# Patient Record
Sex: FEMALE | Race: WHITE | Employment: OTHER | URBAN - METROPOLITAN AREA
[De-identification: names, ages, dates, MRNs, and addresses within clinical notes are randomized per-mention and may not be internally consistent; named-entity substitution may affect disease eponyms.]

---

## 2017-02-17 ENCOUNTER — HOSPITAL ENCOUNTER (OUTPATIENT)
Dept: LAB | Age: 67
Discharge: HOME | End: 2017-02-17
Payer: MEDICARE

## 2017-02-17 LAB — ERYTHROCYTE [SEDIMENTATION RATE] IN BLOOD: 10 MM/HR (ref 0–30)

## 2017-02-17 PROCEDURE — 86140 C-REACTIVE PROTEIN: CPT | Performed by: ORTHOPAEDIC SURGERY

## 2017-02-17 PROCEDURE — 85652 RBC SED RATE AUTOMATED: CPT | Performed by: ORTHOPAEDIC SURGERY

## 2017-02-17 PROCEDURE — 36415 COLL VENOUS BLD VENIPUNCTURE: CPT | Performed by: ORTHOPAEDIC SURGERY

## 2017-02-18 LAB — CRP SERPL-MCNC: <0.3 MG/DL (ref 0–0.3)

## 2017-05-04 ENCOUNTER — HOSPITAL ENCOUNTER (EMERGENCY)
Age: 67
Discharge: HOME | End: 2017-05-05
Attending: EMERGENCY MEDICINE
Payer: MEDICARE

## 2017-05-04 DIAGNOSIS — F31.10 BIPOLAR I DISORDER WITH MANIA (HCC): Primary | ICD-10-CM

## 2017-05-04 LAB
BASOPHILS # BLD AUTO: 0 K/UL (ref 0–0.1)
BASOPHILS # BLD: 0 % (ref 0–2)
DIFFERENTIAL METHOD BLD: NORMAL
EOSINOPHIL # BLD: 0.1 K/UL (ref 0–0.4)
EOSINOPHIL NFR BLD: 1 % (ref 0–5)
ERYTHROCYTE [DISTWIDTH] IN BLOOD BY AUTOMATED COUNT: 13.5 % (ref 11.6–14.5)
HCT VFR BLD AUTO: 38.9 % (ref 35–45)
HGB BLD-MCNC: 12.8 G/DL (ref 12–16)
LYMPHOCYTES # BLD AUTO: 21 % (ref 21–52)
LYMPHOCYTES # BLD: 1.5 K/UL (ref 0.9–3.6)
MCH RBC QN AUTO: 30.3 PG (ref 24–34)
MCHC RBC AUTO-ENTMCNC: 32.9 G/DL (ref 31–37)
MCV RBC AUTO: 92 FL (ref 74–97)
MONOCYTES # BLD: 0.3 K/UL (ref 0.05–1.2)
MONOCYTES NFR BLD AUTO: 5 % (ref 3–10)
NEUTS SEG # BLD: 5 K/UL (ref 1.8–8)
NEUTS SEG NFR BLD AUTO: 73 % (ref 40–73)
PLATELET # BLD AUTO: 240 K/UL (ref 135–420)
PMV BLD AUTO: 10.4 FL (ref 9.2–11.8)
RBC # BLD AUTO: 4.23 M/UL (ref 4.2–5.3)
WBC # BLD AUTO: 6.9 K/UL (ref 4.6–13.2)

## 2017-05-04 PROCEDURE — 74011250636 HC RX REV CODE- 250/636: Performed by: EMERGENCY MEDICINE

## 2017-05-04 PROCEDURE — 80307 DRUG TEST PRSMV CHEM ANLYZR: CPT | Performed by: EMERGENCY MEDICINE

## 2017-05-04 PROCEDURE — 99284 EMERGENCY DEPT VISIT MOD MDM: CPT

## 2017-05-04 PROCEDURE — 93005 ELECTROCARDIOGRAM TRACING: CPT

## 2017-05-04 PROCEDURE — 80053 COMPREHEN METABOLIC PANEL: CPT | Performed by: EMERGENCY MEDICINE

## 2017-05-04 PROCEDURE — 85025 COMPLETE CBC W/AUTO DIFF WBC: CPT | Performed by: EMERGENCY MEDICINE

## 2017-05-04 PROCEDURE — 82550 ASSAY OF CK (CPK): CPT | Performed by: EMERGENCY MEDICINE

## 2017-05-04 PROCEDURE — 96372 THER/PROPH/DIAG INJ SC/IM: CPT

## 2017-05-04 RX ORDER — LORAZEPAM 2 MG/ML
2 INJECTION INTRAMUSCULAR ONCE
Status: COMPLETED | OUTPATIENT
Start: 2017-05-04 | End: 2017-05-04

## 2017-05-04 RX ORDER — HALOPERIDOL 5 MG/ML
5 INJECTION INTRAMUSCULAR
Status: COMPLETED | OUTPATIENT
Start: 2017-05-04 | End: 2017-05-04

## 2017-05-04 RX ADMIN — LORAZEPAM 2 MG: 2 INJECTION INTRAMUSCULAR; INTRAVENOUS at 22:54

## 2017-05-04 RX ADMIN — HALOPERIDOL LACTATE 5 MG: 5 INJECTION, SOLUTION INTRAMUSCULAR at 22:55

## 2017-05-05 VITALS
HEART RATE: 65 BPM | SYSTOLIC BLOOD PRESSURE: 127 MMHG | TEMPERATURE: 97.1 F | HEIGHT: 68 IN | BODY MASS INDEX: 24.67 KG/M2 | RESPIRATION RATE: 18 BRPM | DIASTOLIC BLOOD PRESSURE: 80 MMHG | WEIGHT: 162.8 LBS

## 2017-05-05 LAB
ALBUMIN SERPL BCP-MCNC: 3.6 G/DL (ref 3.4–5)
ALBUMIN/GLOB SERPL: 1.1 {RATIO} (ref 0.8–1.7)
ALP SERPL-CCNC: 79 U/L (ref 45–117)
ALT SERPL-CCNC: 36 U/L (ref 13–56)
AMPHET UR QL SCN: NEGATIVE
ANION GAP BLD CALC-SCNC: 10 MMOL/L (ref 3–18)
APAP SERPL-MCNC: <2 UG/ML (ref 10–30)
APPEARANCE UR: CLEAR
AST SERPL W P-5'-P-CCNC: 26 U/L (ref 15–37)
ATRIAL RATE: 84 BPM
BACTERIA URNS QL MICRO: NEGATIVE /HPF
BARBITURATES UR QL SCN: NEGATIVE
BENZODIAZ UR QL: NEGATIVE
BILIRUB SERPL-MCNC: 0.2 MG/DL (ref 0.2–1)
BILIRUB UR QL: NEGATIVE
BUN SERPL-MCNC: 33 MG/DL (ref 7–18)
BUN/CREAT SERPL: 31 (ref 12–20)
CALCIUM SERPL-MCNC: 9.1 MG/DL (ref 8.5–10.1)
CALCULATED P AXIS, ECG09: 60 DEGREES
CALCULATED R AXIS, ECG10: 43 DEGREES
CALCULATED T AXIS, ECG11: 54 DEGREES
CANNABINOIDS UR QL SCN: POSITIVE
CHLORIDE SERPL-SCNC: 102 MMOL/L (ref 100–108)
CK MB CFR SERPL CALC: 2.2 % (ref 0–4)
CK MB SERPL-MCNC: 2.9 NG/ML (ref 5–25)
CK SERPL-CCNC: 130 U/L (ref 26–192)
CO2 SERPL-SCNC: 26 MMOL/L (ref 21–32)
COCAINE UR QL SCN: NEGATIVE
COLOR UR: YELLOW
CREAT SERPL-MCNC: 1.08 MG/DL (ref 0.6–1.3)
DIAGNOSIS, 93000: NORMAL
EPITH CASTS URNS QL MICRO: NORMAL /LPF (ref 0–5)
ETHANOL SERPL-MCNC: <3 MG/DL (ref 0–3)
GLOBULIN SER CALC-MCNC: 3.3 G/DL (ref 2–4)
GLUCOSE SERPL-MCNC: 203 MG/DL (ref 74–99)
GLUCOSE UR STRIP.AUTO-MCNC: >1000 MG/DL
HDSCOM,HDSCOM: ABNORMAL
HGB UR QL STRIP: NEGATIVE
KETONES UR QL STRIP.AUTO: NEGATIVE MG/DL
LEUKOCYTE ESTERASE UR QL STRIP.AUTO: ABNORMAL
METHADONE UR QL: NEGATIVE
NITRITE UR QL STRIP.AUTO: NEGATIVE
OPIATES UR QL: NEGATIVE
P-R INTERVAL, ECG05: 138 MS
PCP UR QL: NEGATIVE
PH UR STRIP: 6.5 [PH] (ref 5–8)
POTASSIUM SERPL-SCNC: 4.3 MMOL/L (ref 3.5–5.5)
PROT SERPL-MCNC: 6.9 G/DL (ref 6.4–8.2)
PROT UR STRIP-MCNC: NEGATIVE MG/DL
Q-T INTERVAL, ECG07: 380 MS
QRS DURATION, ECG06: 88 MS
QTC CALCULATION (BEZET), ECG08: 449 MS
RBC #/AREA URNS HPF: NEGATIVE /HPF (ref 0–5)
SALICYLATES SERPL-MCNC: <2.8 MG/DL (ref 2.8–20)
SODIUM SERPL-SCNC: 138 MMOL/L (ref 136–145)
SP GR UR REFRACTOMETRY: 1.02 (ref 1–1.03)
TROPONIN I SERPL-MCNC: <0.02 NG/ML (ref 0–0.04)
UROBILINOGEN UR QL STRIP.AUTO: 0.2 EU/DL (ref 0.2–1)
VENTRICULAR RATE, ECG03: 84 BPM
WBC URNS QL MICRO: NORMAL /HPF (ref 0–4)

## 2017-05-05 PROCEDURE — 80307 DRUG TEST PRSMV CHEM ANLYZR: CPT | Performed by: EMERGENCY MEDICINE

## 2017-05-05 PROCEDURE — 81001 URINALYSIS AUTO W/SCOPE: CPT | Performed by: EMERGENCY MEDICINE

## 2017-05-05 NOTE — BSMART NOTE
Pt seen by Crisis in ED room 3. CC: chest pain, manic, THC abuse. Pt is alert, oriented , cooperative. Pt denies SI, HI or hallucinations. Pt is manic , loose associations. Pt is able to answer all questions but needs frequent redirection and has difficulty staying on topic. She denies hallucinations and does not appear to be responding to internal stimuli. Pt came to ED after calling 911 herself for C/O chest pain and having a heart attack. Pt has been medically cleared and did not have a heart attack. Pt admits to smoking marijuana prior to coming to the ED. She reports daily use of same. She is on Zoloft and Ativan and see's Dr Armando Suh out pt. She missed an out pt appointment yesterday. Pt is not suicidal, homicidal nor psychotic. She does not wish to be adm to the hospital. She does not meet criteria for a TDO. Per ER MD the family indicated to her they are all right with her returning home.      Plan : call for an ASAP appointment with her out pt tx team.  Discussed with ER MD.

## 2017-05-05 NOTE — ED NOTES
Pt. Arrived via [employer]. Called medics reporting she was having a heart attack. When medics arrived she was sitting down and flapping her arms. The strong smell of mariajuana noted. EKG normal in route, SBP elevated in 130s. Pt cursing and screaming upon arrival to hospital. Will not allow me to take get vitals or BP. Pt. Unable to carry coherent conversation.  Taking to herself, mumbling things like \"Mama wont let them in the house any more\" \"Gianluca worries when Charla Lin doesn't come home early\" \"I'm not drunk test my blood\"

## 2017-05-05 NOTE — ED PROVIDER NOTES
HPI Comments: 12:21 AM Mallory Mckay is a 77 y.o. female w/ hx of hypothyroidism, depression, stroke, and HTN who presents to the ED, via EMS, for mental health evaluation. Pt states that she called 911 due to CP earlier. Pt states that she thought she was going to die but her mother didnt believe her. Pt states that she did die but Charu Pipes brought her back. \" Pt notes that her pain is resolved but she knows that it \"was her heart that tried to kill her. \" Pt has been admitted in the past for psychosis. Pt has no other sx or complaints. The history is provided by the patient, the EMS personnel and medical records.         Past Medical History:   Diagnosis Date    Acute blood loss as cause of postoperative anemia 12/11/2016    Depression     On Sertraline    Dyslipidemia     On Atorvastatin    History of blood transfusion 12/11/2016    S/P Blood transfusion 2 units pRBC (12/11/2016)    History of fracture of humerus     Comminuted minimally displaced proximal right humerus fracture with extension into the greater tuberosity    History of fracture of lower extremity 2016    Left    History of hypertension     History of stroke without residual deficits 1998    History of thumb fracture     Left    Hypothyroidism (acquired)     Type 2 diabetes mellitus ([facility] [address]     Vitamin D insufficiency 12/14/2016    Vitamin D 25-Hydroxy (12/14/2016) = 22.0       Past Surgical History:   Procedure Laterality Date    HX CAROTID ENDARTERECTOMY Left     HX FEMUR FRACTURE TX Left 12/10/2016    S/P Open reduction internal fixation of closed displaced intertrochanteric fracture of the left hip  (12/10/2016 - Dr. Jolley [place], [place].)    HX HEMORRHOIDECTOMY  1972         Family History:   Problem Relation Age of Onset    COPD Mother     Heart Disease Father        Social History     Social History    Marital status: SINGLE     Spouse name: N/A    Number of children: N/A    Years of education: N/A     Occupational History  Not on file. Social History Main Topics    Smoking status: Former Smoker    Smokeless tobacco: Not on file    Alcohol use Yes      Comment: occas    Drug use: No    Sexual activity: Not on file     Other Topics Concern    Not on file     Social History Narrative         ALLERGIES: Percocet [oxycodone-acetaminophen]    Review of Systems   Unable to perform ROS: Mental status change       Vitals:    05/04/17 2300 05/04/17 2304 05/05/17 0720   BP: 135/75  127/80   Pulse:   65   Resp:   18   Temp:  98.1 °F (36.7 °C) 97.1 °F (36.2 °C)   Weight:  73.8 kg (162 lb 12.8 oz)    Height:  5' 8\" (1.727 m)             Physical Exam   Constitutional: No distress. HENT:   Head: Atraumatic. Eyes: Conjunctivae are normal.   Neck: Normal range of motion. Neck supple. Cardiovascular: Normal rate, regular rhythm and normal heart sounds. Pulmonary/Chest: Effort normal and breath sounds normal. She exhibits no tenderness. Abdominal: Soft. Bowel sounds are normal. She exhibits no distension. There is no tenderness. There is no rebound and no guarding. Musculoskeletal: Normal range of motion. She exhibits no edema or tenderness. Neurological: She is alert. No cranial nerve deficit. Gait normal.   Skin: Skin is warm and dry. Psychiatric: Her speech is tangential. She is hyperactive. She expresses no homicidal and no suicidal ideation. Nursing note and vitals reviewed. MDM  Number of Diagnoses or Management Options  Bipolar I disorder with brennan [facility]):   Diagnosis management comments: Francisco Wolfe is a 77 y.o. female presenting with multiple complaints, cp being one, manic behavior but denies SI or HI. Pt medically cleared, History and exam not consistent with aortic pathology or pulmonary embolus at this time. I have discussed results of work up with patient.   Pt resting comfortably, I have spoken with her sister and daughter over the course of the night and both state she is in her usual state of health and mental status. Eduardo Loss with crisis has evaluated pt as well and agrees with plan of outpt follow up. Return precautions discussed. Patient stated verbal understanding and agrees with course and plan. ED Course       Procedures        Vitals:  Patient Vitals for the past 12 hrs:   Temp Pulse Resp BP   05/05/17 0720 97.1 °F (36.2 °C) 65 18 127/80           EKG interpretation by ED Physician:  2309 Normal sinus at rate of 84; normal axis; no STEMI      Disposition:  Diagnosis:   1. Bipolar I disorder with brennan ([facility] [address]        Disposition: Discharged home in stable condition           Scribe Attestation:   ITeddy, am scribing for and in the presence of Lois Rodriguez MD on this day 05/05/17 at [address]    Provider Attestation:  I personally performed the services described in the documentation, reviewed the documentation, as recorded by the scribe in my presence, and it accurately and completely records my words and actions.   Lois Rodriguez MD. 12:21 AM      Signed by: Bhargav Nation, 12:21 AM

## 2017-05-05 NOTE — DISCHARGE INSTRUCTIONS
Bipolar Disorder: Care Instructions  Your Care Instructions  Bipolar disorder is an illness that causes extreme mood changes, from times of very high energy (manic episodes) to times of depression. But many people with bipolar disorder show only the symptoms of depression. These moods may cause problems with your work, school, family life, friendships, and how well you function. This disease is also called manic-depression. There is no cure for bipolar disorder, but it can be helped with medicines. Counseling may also help. It is important to take your medicines exactly as prescribed, even when you feel well. You may need lifelong treatment. Follow-up care is a key part of your treatment and safety. Be sure to make and go to all appointments, and call your doctor if you are having problems. It's also a good idea to know your test results and keep a list of the medicines you take. How can you care for yourself at home? · Be safe with medicines. Take your medicines exactly as prescribed. Do not stop or change a medicine without talking to your doctor first. Dorothy Palma and your doctor may need to try different combinations of medicines to find what works for you. · Take your medicines on schedule to keep your moods even. When you feel good, you may think that you do not need your medicines. But it is important to keep taking them. · Go to your counseling sessions. Call and talk with your counselor if you can't go to a session or if you don't think the sessions are helping. Do not just stop going. · Get at least 30 minutes of activity on most days of the week. Walking is a good choice. You also may want to do other things, such as running, swimming, or cycling. · Get enough sleep. Keep your room dark and quiet. Try to go to bed at the same time every night. · Eat a healthy diet. This includes whole grains, dairy, fruits, vegetables, and protein. Eat foods from each of these groups. · Try to lower your stress. Manage your time, build a strong support system, and lead a healthy lifestyle. To lower your stress, try physical activity, slow deep breathing, or getting a massage. · Do not use alcohol or illegal drugs. · Learn the early signs of your mood changes. You can then take steps to help yourself feel better. · Ask for help from friends and family when you need it. You may need help with daily chores when you are depressed. When you are manic, you may need support to control your high energy levels. What should you do if someone in your family has bipolar disorder? · Learn about the disease and the signs that it is getting worse. · Remind your family member that you love him or her. · Make a plan with all family members about how to take care of your loved one when his or her symptoms are bad. · Talk about your fears and concerns and those of other family members. Seek counseling if needed. · Do not focus attention only on the person who is in treatment. · Remind yourself that it will take time for changes to occur. · Do not blame yourself for the disease. · Know your legal rights and the legal rights of your family member. Support groups or counselors can help you with this information. · Take care of yourself. Keep up with your own interests, such as your career, hobbies, and friends. Use exercise, positive self-talk, deep breathing, and other relaxing exercises to help lower your stress. · Give yourself time to grieve. You may need to deal with emotions such as anger, fear, and frustration. After you work through your feelings, you will be better able to care for yourself and your family. · If you are having a hard time with your feelings or with your relationship with your family member, talk with a counselor. When should you call for help? Call 911 anytime you think you may need emergency care. For example, call if:  · You feel like hurting yourself or someone else.   · Someone who has bipolar disorder displays dangerous behavior, and you think the person might hurt himself or herself or someone else. Call your doctor now or seek immediate medical care if:  · You hear voices. · Someone you know has bipolar disorder and talks about suicide. Keep the numbers for these national suicide hotlines: 9-342-593-TALK (9-860.471.8618) and 6-272-GIUOWBJ (6-120.731.8693). If a suicide threat seems real, with a specific plan and a way to carry it out, stay with the person, or ask someone you trust to stay with the person, until you can get help. · Someone you know has bipolar disorder and:  ¨ Starts to give away possessions. ¨ Is using illegal drugs or drinking alcohol heavily. ¨ Talks or writes about death, including writing suicide notes or talking about guns, knives, or pills. ¨ Talks or writes about hurting someone else. ¨ Starts to spend a lot of time alone. ¨ Acts very aggressively or suddenly appears calm. ¨ Talks about beliefs that are not based in reality (delusions). Watch closely for changes in your health, and be sure to contact your doctor if:  · You cannot go to your counseling sessions. Where can you learn more? Go to http://lesly-donaldo.info/. Enter K052 in the search box to learn more about \"Bipolar Disorder: Care Instructions. \"  Current as of: July 26, 2016  Content Version: 11.2  © 8075-5792 [employer]. Care instructions adapted under license by [employer] (which disclaims liability or warranty for this information). If you have questions about a medical condition or this instruction, always ask your healthcare professional. [address] any warranty or liability for your use of this information.

## 2017-05-05 NOTE — ED NOTES
Bedside and Verbal shift change report given to Loida Cortez RN (oncoming nurse) by Veronika Flores RN (offgoing nurse). Report included the following information SBAR, Kardex and ED Summary.